# Patient Record
Sex: FEMALE | Race: WHITE | Employment: OTHER | URBAN - METROPOLITAN AREA
[De-identification: names, ages, dates, MRNs, and addresses within clinical notes are randomized per-mention and may not be internally consistent; named-entity substitution may affect disease eponyms.]

---

## 2018-10-18 ENCOUNTER — THERAPY VISIT (OUTPATIENT)
Dept: PHYSICAL THERAPY | Facility: CLINIC | Age: 63
End: 2018-10-18
Payer: COMMERCIAL

## 2018-10-18 DIAGNOSIS — M25.511 ACUTE PAIN OF RIGHT SHOULDER: Primary | ICD-10-CM

## 2018-10-18 PROCEDURE — 97110 THERAPEUTIC EXERCISES: CPT | Mod: GP | Performed by: PHYSICAL THERAPIST

## 2018-10-18 PROCEDURE — 97161 PT EVAL LOW COMPLEX 20 MIN: CPT | Mod: GP | Performed by: PHYSICAL THERAPIST

## 2018-10-18 PROCEDURE — 97112 NEUROMUSCULAR REEDUCATION: CPT | Mod: GP | Performed by: PHYSICAL THERAPIST

## 2018-10-18 NOTE — MR AVS SNAPSHOT
After Visit Summary   10/18/2018    Leyda Fletcher    MRN: [id number]           Patient Information     Date Of Birth          1955        Visit Information        Provider Department      10/18/2018 10:20 AM Katheryn Kent PT [facility]        Today's Diagnoses     Acute pain of right shoulder    -  1       Follow-ups after your visit        Your next 10 appointments already scheduled     Oct 27, 2018  9:10 AM CDT   LUANNE Extremity with Katheryn Kent PT   [facility] ([facility]  )    [address]  [facility] [address]   [phone]            Nov 01, 2018  4:10 PM CDT   LUANNE Extremity with Katheryn Kent PT   [facility] ([facility]  )    [address]  [facility] [id number] [phone]              Who to contact     If you have questions or need follow up information about today's clinic visit or your schedule please contact [facility] directly at [phone].  Normal or non-critical lab and imaging results will be communicated to you by MyChart, letter or phone within 4 business days after the clinic has received the results. If you do not hear from us within 7 days, please contact the clinic through MyChart or phone. If you have a critical or abnormal lab result, we will notify you by phone as soon as possible.  Submit refill requests through [employer] or call your pharmacy and they will forward the refill request to us. Please allow 3 business days for your refill to be completed.          Additional Information About Your Visit        Care EveryWhere ID     This is your Care EveryWhere ID. This could be used by other organizations to access your [place] medical records  [id number]         Blood Pressure from Last 3 Encounters:   No data found for BP    Weight from  Last 3 Encounters:   No data found for Wt              We Performed the Following     HC PT EVAL, LOW COMPLEXITY     LUANNE INITIAL EVAL REPORT     NEUROMUSCULAR RE-EDUCATION     THERAPEUTIC EXERCISES        Primary Care Provider    None Specified       No primary provider on file.        Equal Access to Services     ILIANA DENT : Hadii aad ku hadapoorvatiffany Heavenrashaad, deniceda luayden, kayta karolandda marisol, gonzalez kjin hayaajas molinatyreemark rivera. So [facility] [phone].    ATENCIÓN: Si habla español, tiene a benoit disposición servicios gratuitos de asistencia lingüística. Llame al [phone].    We comply with applicable federal civil rights laws and [place] laws. We do not discriminate on the basis of race, color, national origin, age, disability, sex, sexual orientation, or gender identity.            Thank you!     Thank you for choosing [facility]  for your care. Our goal is always to provide you with excellent care. Hearing back from our patients is one way we can continue to improve our services. Please take a few minutes to complete the written survey that you may receive in the mail after your visit with us. Thank you!             Your Updated Medication List - Protect others around you: Learn how to safely use, store and throw away your medicines at www.disposemymeds.org.      Notice  As of 10/18/2018 10:58 AM    You have not been prescribed any medications.

## 2018-10-18 NOTE — PROGRESS NOTES
[facility] Initial Evaluation  Subjective:  Patient is a 62 year old female presenting with rehab right shoulder hpi.   Leyda Fletcher is a 62 year old female with a right shoulder condition.  Condition occurred with:  Repetition/overuse.  Condition occurred: at home.  This is a new condition  When doing yard work in early Oct 2018, and noticed right lateral arm pain afterwards..    Patient reports pain:  Lateral.  Radiates to:  Upper arm.  Pain is described as aching and sharp and is constant Pain Scale: 3-7/10.  Associated symptoms:  Loss of motion/stiffness and loss of strength. Pain is worse during the day.  Symptoms are exacerbated by lifting, carrying, lying on extremity, using arm at shoulder level, using arm overhead and using arm behind back (reaching for bag into passenger seat, dressing) and relieved by NSAID's and ice.  Since onset symptoms are gradually improving.                                                      Objective:  System                   Shoulder Evaluation:  ROM:  AROM:    Flexion:  Left:  155    Right:  151+  Extension: Left: 72Right: 61+  Abduction:  Left: 180   Right:  175          Horizontal Adduction:  Left:  Mid R scap    Right:  Posterior shoulder+      Flexion/External Rotation:  Left:  T4    Right:  T3+  Extension/Internal Rotation:  Left:  T2    Right:  T7+          Strength:    Flexion: Left:5-/5   Pain:    Right: 5-/5     Pain:     Abduction:  Left: 5/5  Pain:    Right: 4/5      Pain:+    Internal Rotation:  Left:5-/5     Pain:    Right: 5-/5     Pain:  External Rotation:   Left:4+/5     Pain:   Right:4/5      Pain:+    Horizontal Abduction:  Left:5-/5     Pain:    Right:4-/5     Pain:+        Stability Testing:      Left shoulder stability negative testing:  Load and shift anterior and Load and shift posterior    Right shoulder stability negative testing:  Load and shift anterior and Load and shift posterior  Special Tests:      Left shoulder negative  for the following special tests:  Impingement and Rotator cuff tear  Right shoulder positive for the following special tests:Impingement  Right shoulder negative for the following special tests:Rotator cuff tear  Palpation:      Right shoulder tenderness present at: Biceps and Supraspinatus                                     General     ROS    Assessment/Plan:    Patient is a 62 year old female with right side shoulder complaints.    Patient has the following significant findings with corresponding treatment plan.                Diagnosis 1:  R RC tendinosis  Pain -  hot/cold therapy, self management, education and home program  Decreased ROM/flexibility - manual therapy, therapeutic exercise and home program  Decreased strength - therapeutic exercise, therapeutic activities and home program  Impaired muscle performance - neuro re-education and home program  Decreased function - therapeutic activities and home program    Therapy Evaluation Codes:   1) History comprised of:   Personal factors that impact the plan of care:      None.    Comorbidity factors that impact the plan of care are:      None.     Medications impacting care: None.  2) Examination of Body Systems comprised of:   Body structures and functions that impact the plan of care:      Shoulder.   Activity limitations that impact the plan of care are:      Bathing, Cooking, Driving, Dressing, Lifting and Sleeping.  3) Clinical presentation characteristics are:   Stable/Uncomplicated.  4) Decision-Making    Low complexity using standardized patient assessment instrument and/or measureable assessment of functional outcome.  Cumulative Therapy Evaluation is: Low complexity.    Previous and current functional limitations:  (See Goal Flow Sheet for this information)    Short term and Long term goals: (See Goal Flow Sheet for this information)     Communication ability:  Patient appears to be able to clearly communicate and understand verbal and written  communication and follow directions correctly.  Treatment Explanation - The following has been discussed with the patient:   RX ordered/plan of care  Anticipated outcomes  Possible risks and side effects  This patient would benefit from PT intervention to resume normal activities.   Rehab potential is excellent.    Frequency:  1 X week, once daily  Duration:  for 1 months tapering to 2 X a month over 2 months  Discharge Plan:  Achieve all LTG.  Independent in home treatment program.  Reach maximal therapeutic benefit.    Please refer to the daily flowsheet for treatment today, total treatment time and time spent performing 1:1 timed codes.

## 2018-10-18 NOTE — LETTER
[facility]  [address]  [facility] [place] [facility] [address] [phone]    2018    Re: Leyda Fletcher   :   1955  MRN:  [id number]   REFERRING PHYSICIAN:   Magali Castellanos    [facility]    Date of Initial Evaluation:  10/18/2018  Visits:  Rxs Used: 1  Reason for Referral:  Acute pain of right shoulder    EVALUATION SUMMARY    [facility] Initial Evaluation  Subjective:  Patient is a 62 year old female presenting with rehab right shoulder hpi.   Leyda Fletcher is a 62 year old female with a right shoulder condition.  Condition occurred with:  Repetition/overuse.  Condition occurred: at home.  This is a new condition  When doing yard work in early Oct 2018, and noticed right lateral arm pain afterwards..    Patient reports pain:  Lateral.  Radiates to:  Upper arm.  Pain is described as aching and sharp and is constant Pain Scale: 3-7/10.  Associated symptoms:  Loss of motion/stiffness and loss of strength. Pain is worse during the day.  Symptoms are exacerbated by lifting, carrying, lying on extremity, using arm at shoulder level, using arm overhead and using arm behind back (reaching for bag into passenger seat, dressing) and relieved by NSAID's and ice.  Since onset symptoms are gradually improving.     Pertinent medical history includes:  Thyroid problems.  Medical allergies: no.  Other surgeries include:  None reported.  Current medications:  Sleep medication, thyroid medication and other.      Primary job tasks include:  Driving, prolonged sitting and other.               Objective:  Shoulder Evaluation:  ROM:  AROM:    Flexion:  Left:  155    Right:  151+  Extension: Left: 72Right: 61+  Abduction:  Left: 180   Right:  175  Horizontal Adduction:  Left:  Mid R scap    Right:  Posterior shoulder+  Flexion/External Rotation:  Left:  T4    Right:  T3+  Extension/Internal  Rotation:  Left:  T2    Right:  T7+    Strength:    Flexion: Left:5-/5   Pain:    Right: 5-/5     Pain:   Abduction:  Left: 5/5  Pain:    Right: 4/5      Pain:+  Internal Rotation:  Left:5-/5     Pain:    Right: 5-/5     Pain:  External Rotation:   Left:4+/5     Pain:   Right:4/5      Pain:+    Horizontal Abduction:  Left:5-/5     Pain:    Right:4-/5     Pain:+    Re: Leyda Fletcher   :   1955      Stability Testing:    Left shoulder stability negative testing:  Load and shift anterior and Load and shift posterior  Right shoulder stability negative testing:  Load and shift anterior and Load and shift posterior  Special Tests:    Left shoulder negative for the following special tests:  Impingement and Rotator cuff tear  Right shoulder positive for the following special tests:Impingement  Right shoulder negative for the following special tests:Rotator cuff tear  Palpation:    Right shoulder tenderness present at: Biceps and Supraspinatus    Assessment/Plan:    Patient is a 62 year old female with right side shoulder complaints.    Patient has the following significant findings with corresponding treatment plan.                Diagnosis 1:  R RC tendinosis  Pain -  hot/cold therapy, self management, education and home program  Decreased ROM/flexibility - manual therapy, therapeutic exercise and home program  Decreased strength - therapeutic exercise, therapeutic activities and home program  Impaired muscle performance - neuro re-education and home program  Decreased function - therapeutic activities and home program  Therapy Evaluation Codes:   1) History comprised of:   Personal factors that impact the plan of care:      None.    Comorbidity factors that impact the plan of care are:      None.     Medications impacting care: None.  2) Examination of Body Systems comprised of:   Body structures and functions that impact the plan of care:      Shoulder.   Activity limitations that impact the plan of care are:       Bathing, Cooking, Driving, Dressing, Lifting and Sleeping.  3) Clinical presentation characteristics are:   Stable/Uncomplicated.  4) Decision-Making    Low complexity using standardized patient assessment instrument and/or measureable assessment of functional outcome.  Cumulative Therapy Evaluation is: Low complexity.  Previous and current functional limitations:  (See Goal Flow Sheet for this information)    Short term and Long term goals: (See Goal Flow Sheet for this information)   Communication ability:  Patient appears to be able to clearly communicate and understand verbal and written communication and follow directions correctly.  Treatment Explanation - The following has been discussed with the patient:   RX ordered/plan of care  Anticipated outcomes  Possible risks and side effects  This patient would benefit from PT intervention to resume normal activities.   Rehab potential is excellent.  Re: Leyda Fletcher   :   1955      Frequency:  1 X week, once daily  Duration:  for 1 months tapering to 2 X a month over 2 months  Discharge Plan:  Achieve all LTG.  Independent in home treatment program.  Reach maximal therapeutic benefit.    Thank you for your referral.        INQUIRIES  Therapist: Katheryn Kent PT   [facility]  [address] [place] [address] [address]  [facility] [address]  Phone: [phone]  Fax: [phone]

## 2018-10-27 ENCOUNTER — THERAPY VISIT (OUTPATIENT)
Dept: PHYSICAL THERAPY | Facility: CLINIC | Age: 63
End: 2018-10-27
Payer: COMMERCIAL

## 2018-10-27 DIAGNOSIS — M25.511 ACUTE PAIN OF RIGHT SHOULDER: ICD-10-CM

## 2018-10-27 PROCEDURE — 97112 NEUROMUSCULAR REEDUCATION: CPT | Mod: GP | Performed by: PHYSICAL THERAPIST

## 2018-10-27 PROCEDURE — 97110 THERAPEUTIC EXERCISES: CPT | Mod: GP | Performed by: PHYSICAL THERAPIST

## 2018-11-10 ENCOUNTER — THERAPY VISIT (OUTPATIENT)
Dept: PHYSICAL THERAPY | Facility: CLINIC | Age: 63
End: 2018-11-10
Payer: COMMERCIAL

## 2018-11-10 DIAGNOSIS — M25.511 ACUTE PAIN OF RIGHT SHOULDER: ICD-10-CM

## 2018-11-10 PROCEDURE — 97112 NEUROMUSCULAR REEDUCATION: CPT | Mod: GP | Performed by: PHYSICAL THERAPIST

## 2018-11-10 PROCEDURE — 97110 THERAPEUTIC EXERCISES: CPT | Mod: GP | Performed by: PHYSICAL THERAPIST

## 2019-01-19 ENCOUNTER — THERAPY VISIT (OUTPATIENT)
Dept: PHYSICAL THERAPY | Facility: CLINIC | Age: 64
End: 2019-01-19
Payer: COMMERCIAL

## 2019-01-19 DIAGNOSIS — M25.511 ACUTE PAIN OF RIGHT SHOULDER: ICD-10-CM

## 2019-01-19 PROCEDURE — 97110 THERAPEUTIC EXERCISES: CPT | Mod: GP | Performed by: PHYSICAL THERAPIST

## 2019-01-19 PROCEDURE — 97112 NEUROMUSCULAR REEDUCATION: CPT | Mod: GP | Performed by: PHYSICAL THERAPIST

## 2019-01-19 NOTE — LETTER
[facility]  [address]  [facility] [id number] [phone]    2019    Re: Leyda Fletcher   :   1955  MRN:  [id number]   REFERRING PHYSICIAN:   Magali Castellanos    [facility]    Date of Initial Evaluation:  10-18-18  Visits:  Rxs Used: 4  Reason for Referral:  Acute pain of right shoulder    PROGRESS  REPORT  Progress reporting period is from 10/18/18 to 19.       SUBJECTIVE  Subjective changes noted by patient:   Doing HEP consistently and is up to 3# now.  THe shoulder feels better and stronger, but bicep can get irritated and the elbow/forearm can get sore picking things up with forearm pronated.    Current Pain level: (0-2/10).     Initial Pain level: (3-7/10).   Changes in function:  Yes (See Goal flowsheet attached for changes in current functional level)  Adverse reaction to treatment or activity: None    OBJECTIVE  Changes noted in objective findings:  Yes,     R Shoulder AROM: all equal to L side and painfree.    R shoulder strength: flex 5-/5, abd 4+/5+/-, ext 5/5 horiz abd 4/5.    Tender R extensor bundle and minor pain with resisted R wrist ext.  Tender R bicep tendon as well.       ASSESSMENT/PLAN  Updated problem list and treatment plan: Diagnosis 1:  R RC tendinosis  Pain -  self management, education and home program  Decreased strength - therapeutic exercise and therapeutic activities  Impaired muscle performance - neuro re-education and home program  STG/LTGs have been met or progress has been made towards goals:  Yes (See Goal flow sheet completed today.)  Assessment of Progress: The patient has met all of their long term goals.  Self Management Plans:  Patient is independent in a home treatment program.  Patient is independent in self management of symptoms.    Leyda continues to require the following intervention to meet STG and LTG's:  PT intervention is no  longer required to meet STG/LTG.      Re: Leyda Fletcher   :   1955    Recommendations:  This patient is ready to be discharged from therapy and continue their home treatment program.    Thank you for your referral.    INQUIRIES  Therapist: Katheryn Kent [facility]   [facility]  [address] [address]  [facility] [address]  Phone: [phone]  Fax: [phone]

## 2019-01-19 NOTE — PROGRESS NOTES
Subjective:  HPI                    Objective:  System    Physical Exam    General     ROS    Assessment/Plan:    PROGRESS  REPORT    Progress reporting period is from 10/18/18 to 1/19/19.       SUBJECTIVE  Subjective changes noted by patient:   Doing HEP consistently and is up to 3# now.  THe shoulder feels better and stronger, but bicep can get irritated and the elbow/forearm can get sore picking things up with forearm pronated.    Current Pain level: (0-2/10).      Initial Pain level: (3-7/10).   Changes in function:  Yes (See Goal flowsheet attached for changes in current functional level)  Adverse reaction to treatment or activity: None    OBJECTIVE  Changes noted in objective findings:  Yes,     R Shoulder AROM: all equal to L side and painfree.    R shoulder strength: flex 5-/5, abd 4+/5+/-, ext 5/5 horiz abd 4/5.    Tender R extensor bundle and minor pain with resisted R wrist ext.  Tender R bicep tendon as well.       ASSESSMENT/PLAN  Updated problem list and treatment plan: Diagnosis 1:  R RC tendinosis  Pain -  self management, education and home program  Decreased strength - therapeutic exercise and therapeutic activities  Impaired muscle performance - neuro re-education and home program  STG/LTGs have been met or progress has been made towards goals:  Yes (See Goal flow sheet completed today.)  Assessment of Progress: The patient has met all of their long term goals.  Self Management Plans:  Patient is independent in a home treatment program.  Patient is independent in self management of symptoms.    Leyda continues to require the following intervention to meet STG and LTG's:  PT intervention is no longer required to meet STG/LTG.    Recommendations:  This patient is ready to be discharged from therapy and continue their home treatment program.    Please refer to the daily flowsheet for treatment today, total treatment time and time spent performing 1:1 timed codes.

## 2024-08-22 ENCOUNTER — TRANSCRIBE ORDERS (OUTPATIENT)
Dept: OTHER | Age: 69
End: 2024-08-22

## 2024-08-22 DIAGNOSIS — H21.9 FOCAL LESION OF IRIS: Primary | ICD-10-CM

## 2024-08-28 ENCOUNTER — TELEPHONE (OUTPATIENT)
Dept: OPHTHALMOLOGY | Facility: CLINIC | Age: 69
End: 2024-08-28
Payer: COMMERCIAL

## 2024-08-28 NOTE — TELEPHONE ENCOUNTER
[facility] Call Center    Phone Message    May a detailed message be left on voicemail: yes     Reason for Call: Appointment Intake    Referring Provider Name: Yamilet Leung MD @[facility]  [address] [address]  Diagnosis and/or Symptoms: Anterior segment - iris lesion right eye- Please schedule with cornea specialist,  Per pt will need a PA for any visits from insurance if clinic can assist pt with requesting the PA  writer is unable to schedule per guidelines please contact pt to discuss options Thank you       Action Taken: Message routed to:  [facility]    Travel Screening: Not Applicable     Date of Service:

## 2024-08-29 NOTE — TELEPHONE ENCOUNTER
Ok for next available with Dr. Castro or Dr. Berg for iris lesion eval per referral.    Note to  to assist in scheduling options.    Abdirizak Gonzales RN 6:08 PM 08/29/24

## 2024-08-30 ENCOUNTER — TELEPHONE (OUTPATIENT)
Dept: OPHTHALMOLOGY | Facility: CLINIC | Age: 69
End: 2024-08-30
Payer: COMMERCIAL

## 2024-11-01 ENCOUNTER — OFFICE VISIT (OUTPATIENT)
Dept: OPHTHALMOLOGY | Facility: CLINIC | Age: 69
End: 2024-11-01
Attending: OPHTHALMOLOGY
Payer: COMMERCIAL

## 2024-11-01 DIAGNOSIS — H21.511 ANTERIOR SYNECHIAE (IRIS), RIGHT EYE: Primary | ICD-10-CM

## 2024-11-01 DIAGNOSIS — H02.88B MEIBOMIAN GLAND DYSFUNCTION (MGD) OF UPPER AND LOWER LIDS OF BOTH EYES: ICD-10-CM

## 2024-11-01 DIAGNOSIS — H25.13 NUCLEAR SCLEROSIS OF BOTH EYES: ICD-10-CM

## 2024-11-01 DIAGNOSIS — H02.88A MEIBOMIAN GLAND DYSFUNCTION (MGD) OF UPPER AND LOWER LIDS OF BOTH EYES: ICD-10-CM

## 2024-11-01 PROBLEM — Z86.018 HISTORY OF DYSPLASTIC NEVUS: Status: ACTIVE | Noted: 2024-01-22

## 2024-11-01 PROBLEM — N95.2 VAGINAL ATROPHY: Status: ACTIVE | Noted: 2022-11-08

## 2024-11-01 PROCEDURE — 92020 GONIOSCOPY: CPT | Performed by: OPHTHALMOLOGY

## 2024-11-01 PROCEDURE — 92285 EXTERNAL OCULAR PHOTOGRAPHY: CPT | Performed by: OPHTHALMOLOGY

## 2024-11-01 PROCEDURE — G0463 HOSPITAL OUTPT CLINIC VISIT: HCPCS | Performed by: OPHTHALMOLOGY

## 2024-11-01 PROCEDURE — 99203 OFFICE O/P NEW LOW 30 MIN: CPT | Mod: GC | Performed by: OPHTHALMOLOGY

## 2024-11-01 RX ORDER — LANOLIN ALCOHOL/MO/W.PET/CERES
1000 CREAM (GRAM) TOPICAL
COMMUNITY

## 2024-11-01 RX ORDER — KETOCONAZOLE 20 MG/ML
SHAMPOO, SUSPENSION TOPICAL
COMMUNITY
Start: 2024-06-26

## 2024-11-01 RX ORDER — PSEUDOEPHEDRINE HCL 30 MG
100 TABLET ORAL
COMMUNITY
Start: 2024-09-30

## 2024-11-01 RX ORDER — LEVOTHYROXINE SODIUM 112 UG/1
1 TABLET ORAL DAILY
COMMUNITY
Start: 2024-09-30

## 2024-11-01 RX ORDER — ZOLPIDEM TARTRATE 5 MG/1
5 TABLET ORAL
COMMUNITY
Start: 2016-09-19

## 2024-11-01 RX ORDER — VITAMIN B COMPLEX
TABLET ORAL
COMMUNITY

## 2024-11-01 ASSESSMENT — VISUAL ACUITY
OS_SC+: -2
OS_PH_SC: 20/25
METHOD: SNELLEN - LINEAR
OS_SC: 20/30
OD_SC+: -2
OD_SC: 20/30
OD_PH_SC: 20/25

## 2024-11-01 ASSESSMENT — CONF VISUAL FIELD
OS_INFERIOR_TEMPORAL_RESTRICTION: 0
OD_SUPERIOR_TEMPORAL_RESTRICTION: 0
OD_INFERIOR_TEMPORAL_RESTRICTION: 0
OS_SUPERIOR_TEMPORAL_RESTRICTION: 0
OD_NORMAL: 1
METHOD: COUNTING FINGERS
OS_SUPERIOR_NASAL_RESTRICTION: 0
OD_INFERIOR_NASAL_RESTRICTION: 0
OD_SUPERIOR_NASAL_RESTRICTION: 0
OS_NORMAL: 1
OS_INFERIOR_NASAL_RESTRICTION: 0

## 2024-11-01 ASSESSMENT — SLIT LAMP EXAM - LIDS
COMMENTS: MEIBOMIAN GLAND DYSFUNCTION
COMMENTS: MEIBOMIAN GLAND DYSFUNCTION

## 2024-11-01 ASSESSMENT — EXTERNAL EXAM - LEFT EYE: OS_EXAM: NORMAL

## 2024-11-01 ASSESSMENT — EXTERNAL EXAM - RIGHT EYE: OD_EXAM: NORMAL

## 2024-11-01 NOTE — PROGRESS NOTES
Chief complaint   Referred for iris lesion OD    HPI    Leyda Fletcher 68 year old female       Chief Complaint(s) and History of Present Illness(es)       Corneal Evaluation    In right eye.  Quality: States va is the same.  Associated symptoms include dryness (more at night).  Negative for redness and tearing.  Treatments tried include artificial tears.  Pain was noted as 0/10.             Comments    Here for Focal lesion of iris  Has not noticed any change   AT at bedtime each eye   Caroline Kay COT 8:06 AM November 1, 2024                     Past ocular history   Prior eye surgery/laser/Trauma: None  CTL wearer:yes (on occasions)  Glasses : Yes  Family Hx of eye disease: None    PMH     Past Medical History:   Diagnosis Date    Celiac disease     Hypothyroidism        PSH   No past surgical history on file.    Meds     Current Outpatient Medications   Medication Sig Dispense Refill    Calcium Carbonate-Vit D-Min (CALCIUM 1200 PO) Take by mouth.      cyanocobalamin (VITAMIN B-12) 1000 MCG tablet Take 1,000 mcg by mouth.       MG capsule Take 100 mg by mouth.      ketoconazole (NIZORAL) 2 % external shampoo Apply topically.      levothyroxine (SYNTHROID/LEVOTHROID) 112 MCG tablet Take 1 tablet by mouth daily.      Vitamin D3 25 mcg (1000 units) tablet       zolpidem (AMBIEN) 5 MG tablet Take 5 mg by mouth.       No current facility-administered medications for this visit.         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  I personally obtained the chief complaint(s) and history of present illness.  I confirmed and edited as necessary the review of systems, past medical/surgical history, family history, social history, and examination findings as documented by others; and I examined the patient myself.  I personally reviewed the relevant tests, images, and reports as documented above.  I formulated and edited as necessary the assessment and plan and discussed the findings and management plan with the patient and family. I personally reviewed the ophthalmic test(s) associated with this encounter, agree with the interpretation(s) as documented by the resident/fellow, and have edited the corresponding report(s) as necessary.- Alexandra Berg MD

## 2024-11-01 NOTE — NURSING NOTE
Chief Complaints and History of Present Illnesses   Patient presents with    Corneal Evaluation     Chief Complaint(s) and History of Present Illness(es)       Corneal Evaluation              Laterality: right eye    Quality: States va is the same    Associated symptoms: dryness (more at night).  Negative for redness and tearing    Treatments tried: artificial tears    Pain scale: 0/10              Comments    Here for Focal lesion of iris  Has not noticed any change   AT at bedtime each eye   Caroline Kay COT 8:06 AM November 1, 2024

## 2024-11-01 NOTE — PATIENT INSTRUCTIONS
Please perform the following:     Eye drops: (please wait approximately 5 minutes between drops if they contain medication)  -Preservative-free artificial tears 1 drop to both eye(s) at least 4 times a day     -examples include: Refresh Plus, Systane preservative-free, Thera Tears, as well as those made by your favorite stores (just please make sure they say preservative-free on them and are in separate vials/dropperettes)    -please be aware that many of these can be re-capped, so as to reduce waste of these please check your specific tears type      Ointment: (be careful of blurry vision and reduce your chances of falling after placing these, 1/4 inch is approximately the size of a grain of rice)  -Artificial tear ointment 1/4 inch to Both eye(s) at bedtime     -examples include: Refresh PM, Lacrilube, etc.    -you may also use thicker drops such as gel drops that last longer but with caution as they blur your vision and should never be placed immediately before activities such as driving; examples include: Systane gel drops, Genteal gel drops, Refresh Liquigel or Celluvisc    Other:   -Take frequent breaks when you read, watch television, or use a computer. Close your eyes. Do not rub your eyes. Artificial tears may help you when you do these activities.  -Use wrap-around sunglasses to protect your eyes from the sun, wind, and grit  -Use a humidifier to increase the moisture in the air  -Avoid smoke, wind, hair dryers, air conditioners, and aerosol sprays  -Drink adequate amounts of water to avoid dehydration  -Do warm compresses 1-2 times daily. This will help loosen up the oils in your eyelids so they can make it to the surface of your eyeball. Process: Heat up a warm compress (uncooked rice in a new, clean sock) to a safe temperature in the microwave, place it over the closed eye for 5 minutes, remove it from the eye, and then massage your eyelids gently (put your finger on your bottom eyelid and roll up in  "the direction of your eyeball, then put your finger on your top eyelid and roll down in the direction of your eyeball).   -Gently wash your eyelashes with baby shampoo daily. This will help remove debris from the exit points of the eyelid oil glands.  If your eyes does not close at night, please use an eye shield, mask or goggles at night, \"\"BLIKIJOY\" is a good brand for eye patch   "

## 2024-12-21 ENCOUNTER — HEALTH MAINTENANCE LETTER (OUTPATIENT)
Age: 69
End: 2024-12-21

## 2025-02-22 ENCOUNTER — HEALTH MAINTENANCE LETTER (OUTPATIENT)
Age: 70
End: 2025-02-22